# Patient Record
Sex: FEMALE | Race: BLACK OR AFRICAN AMERICAN | NOT HISPANIC OR LATINO
[De-identification: names, ages, dates, MRNs, and addresses within clinical notes are randomized per-mention and may not be internally consistent; named-entity substitution may affect disease eponyms.]

---

## 2018-09-05 ENCOUNTER — TRANSCRIPTION ENCOUNTER (OUTPATIENT)
Age: 43
End: 2018-09-05

## 2018-10-14 ENCOUNTER — EMERGENCY (EMERGENCY)
Facility: HOSPITAL | Age: 43
LOS: 1 days | Discharge: HOME | End: 2018-10-14
Attending: EMERGENCY MEDICINE
Payer: COMMERCIAL

## 2018-10-14 VITALS
HEIGHT: 64 IN | TEMPERATURE: 98 F | RESPIRATION RATE: 16 BRPM | DIASTOLIC BLOOD PRESSURE: 80 MMHG | WEIGHT: 182.1 LBS | OXYGEN SATURATION: 100 % | SYSTOLIC BLOOD PRESSURE: 150 MMHG | HEART RATE: 69 BPM

## 2018-10-14 LAB — HCG UR QL: NEGATIVE — SIGNIFICANT CHANGE UP

## 2018-10-14 PROCEDURE — 81025 URINE PREGNANCY TEST: CPT

## 2018-10-14 PROCEDURE — 99284 EMERGENCY DEPT VISIT MOD MDM: CPT | Mod: 25

## 2018-10-14 PROCEDURE — 71250 CT THORAX DX C-: CPT

## 2018-10-14 PROCEDURE — 71046 X-RAY EXAM CHEST 2 VIEWS: CPT

## 2018-10-14 PROCEDURE — 71250 CT THORAX DX C-: CPT | Mod: 26

## 2018-10-14 PROCEDURE — 71046 X-RAY EXAM CHEST 2 VIEWS: CPT | Mod: 26

## 2018-10-14 RX ORDER — AZITHROMYCIN 500 MG/1
1 TABLET, FILM COATED ORAL
Qty: 5 | Refills: 0 | OUTPATIENT
Start: 2018-10-14 | End: 2018-10-18

## 2018-10-14 NOTE — ED ADULT TRIAGE NOTE - CHIEF COMPLAINT QUOTE
States " I have this cough since over a month ,before travelling to [place], I just returned 1 week ago and I still have cough".

## 2018-10-14 NOTE — ED PROVIDER NOTE - OBJECTIVE STATEMENT
42 y/o F with no PMHx or PSHx presents to ED c/o intermittent productive cough x 6 weeks. Pt states occasional breathing difficulty but none at this time. No chest pain, fever, hemoptysis, leg pain/swelling. Pt states went to Urgent Care-1 week after symptoms onset. Chest x-ray of right lower lobe-nodular opacity and advised to have CT-scan, prescribed meds (unknown) states has not worked. After sxs onset traveled to \[facility] [place] [facility]\"" and did not have symptoms there. SHx: No ETOH, Drugs, Cigarette smoking. No hx of DVT, PE. NKDA.

## 2018-10-14 NOTE — ED PROVIDER NOTE - MEDICAL DECISION MAKING DETAILS
Pt well appearing, with severe weeks of productive cough. Will order X-ray recently showed nodular opacity will check CT pt is PERC negative.

## 2018-10-14 NOTE — ED PROVIDER NOTE - PROGRESS NOTE DETAILS
Spoke about CT findings to Dr. Augusto Patel, advised for antibiotics and prednisone. Told pt to follow up in office for pulmonology function test and further management results explained to patient. Spoke about CT findings to Dr. Augusto Patel, pulmonary, and he advised antibiotics and prednisone. Advised pt to follow up in office for pulmonology function test and further management, results explained to patient.  Return precautions given.

## 2019-02-12 PROBLEM — Z00.00 ENCOUNTER FOR PREVENTIVE HEALTH EXAMINATION: Status: ACTIVE | Noted: 2019-02-12

## 2019-02-19 ENCOUNTER — APPOINTMENT (OUTPATIENT)
Dept: THORACIC SURGERY | Facility: CLINIC | Age: 44
End: 2019-02-19
Payer: COMMERCIAL

## 2019-02-19 VITALS
TEMPERATURE: 97.9 F | HEART RATE: 73 BPM | BODY MASS INDEX: 33.66 KG/M2 | RESPIRATION RATE: 18 BRPM | HEIGHT: 63 IN | WEIGHT: 190 LBS | OXYGEN SATURATION: 100 % | SYSTOLIC BLOOD PRESSURE: 138 MMHG | DIASTOLIC BLOOD PRESSURE: 84 MMHG

## 2019-02-19 DIAGNOSIS — Z78.9 OTHER SPECIFIED HEALTH STATUS: ICD-10-CM

## 2019-02-19 PROCEDURE — 99245 OFF/OP CONSLTJ NEW/EST HI 55: CPT

## 2019-02-19 NOTE — DATA REVIEWED
[FreeTextEntry1] : CT chest on 10/14/18: indeterminant 1.6 x 1.0cm RLL density with almost tubular appearance.\par Repeated CT on 1/28/19: noncalcified masslike pulmonary lesion in the lateral aspect of the anterior mid to lower RLL. \par \par PFT on 2/11/19: FEV1 1.46, 51%.

## 2019-02-19 NOTE — HISTORY OF PRESENT ILLNESS
[FreeTextEntry1] : 42 y/o female, never smoker, without significant Pmhx, presented to local urgent care with cough x 1 month in Sep 2018, CXR revealed lung mass, completed of antibiotics course. \par \par Subsequent CT chest on 10/14/18: indeterminant 1.6 x 1.0cm RLL density with almost tubular appearance.\par Repeated CT on 1/28/19: noncalcified masslike pulmonary lesion in the lateral aspect of the anterior mid to lower RLL. \par \par PFT on 2/11/19: FEV1 1.46, 51%.\par \par She presents today for surgical consultation. The patient denies SOB, cough, chest pain, hemoptysis, palpitation, fever, recent illness, hospitalization and significant weight loss.\par \par \par

## 2019-02-19 NOTE — ASSESSMENT
[FreeTextEntry1] : 42 y/o female, never smoker, without significant Pmhx, presented to local urgent care with cough x 1 month in Sep 2018, CXR revealed lung mass, completed of antibiotics course. \par \par Subsequent CT chest on 10/14/18: indeterminant 1.6 x 1.0cm RLL density with almost tubular appearance.\par Repeated CT on 1/28/19: noncalcified masslike pulmonary lesion in the lateral aspect of the anterior mid to lower RLL. \par \par PFT on 2/11/19: FEV1 1.46, 51%.\par \par I have reviewed the patient's medical records and diagnostic images at the time of this office consultation and have made the following recommendation.\par Plan:\par 1. Right VATS, robotic assisted, Right lower lobe wedge resection, [facility] at [facility] ( date to be determinate). All risks vs. benefits and alternatives were explained to the patient, all questions were answered, patient verbalized understanding, was in agreement with the plan to proceed.\par 2. Medical clearance. \par 3. PST. \par 4. Complete PFT. \par \par \par \par Written by Shauna Montemayor NP, acting as a scribe for Dr. Luis M Hernandez.      \par “The documentation recorded by the scribe accurately reflects the service I personally performed and the decisions made by me.” Luis M Hernandez MD.\par \par

## 2019-02-19 NOTE — PHYSICAL EXAM
[General Appearance - Alert] : alert [Sclera] : the sclera and conjunctiva were normal [PERRL With Normal Accommodation] : pupils were equal in size, round, and reactive to light [Outer Ear] : the ears and nose were normal in appearance [Hearing Threshold Finger Rub Not Daniels] : hearing was normal [Neck Appearance] : the appearance of the neck was normal [Neck Cervical Mass (___cm)] : no neck mass was observed [Respiration, Rhythm And Depth] : normal respiratory rhythm and effort [Auscultation Breath Sounds / Voice Sounds] : lungs were clear to auscultation bilaterally [Heart Rate And Rhythm] : heart rate was normal and rhythm regular [Heart Sounds] : normal S1 and S2 [Examination Of The Chest] : the chest was normal in appearance [2+] : left 2+ [No Abnormalities] : the abdominal aorta was not enlarged and no bruit was heard [Bowel Sounds] : normal bowel sounds [Abdomen Soft] : soft [Abdomen Tenderness] : non-tender [Cervical Lymph Nodes Enlarged Posterior Bilaterally] : posterior cervical [Cervical Lymph Nodes Enlarged Anterior Bilaterally] : anterior cervical [No CVA Tenderness] : no ~M costovertebral angle tenderness [Abnormal Walk] : normal gait [Nail Clubbing] : no clubbing  or cyanosis of the fingernails [Musculoskeletal - Swelling] : no joint swelling seen [Skin Color & Pigmentation] : normal skin color and pigmentation [Skin Turgor] : normal skin turgor [] : no rash [No Focal Deficits] : no focal deficits [Oriented To Time, Place, And Person] : oriented to person, place, and time [Affect] : the affect was normal [Mood] : the mood was normal [FreeTextEntry1] : deferred

## 2019-02-19 NOTE — REASON FOR VISIT
[Consultation] : a consultation visit [Family Member] : family member [FreeTextEntry1] : lung nodule

## 2019-03-18 ENCOUNTER — OUTPATIENT (OUTPATIENT)
Dept: OUTPATIENT SERVICES | Facility: HOSPITAL | Age: 44
LOS: 1 days | End: 2019-03-18

## 2019-03-18 VITALS
OXYGEN SATURATION: 97 % | HEART RATE: 83 BPM | SYSTOLIC BLOOD PRESSURE: 142 MMHG | HEIGHT: 63.5 IN | WEIGHT: 188.05 LBS | TEMPERATURE: 97 F | DIASTOLIC BLOOD PRESSURE: 85 MMHG | RESPIRATION RATE: 16 BRPM

## 2019-03-18 DIAGNOSIS — R91.1 SOLITARY PULMONARY NODULE: ICD-10-CM

## 2019-03-18 DIAGNOSIS — Z98.890 OTHER SPECIFIED POSTPROCEDURAL STATES: Chronic | ICD-10-CM

## 2019-03-18 LAB
ALBUMIN SERPL ELPH-MCNC: 4.7 G/DL — SIGNIFICANT CHANGE UP (ref 3.3–5)
ALP SERPL-CCNC: 96 U/L — SIGNIFICANT CHANGE UP (ref 40–120)
ALT FLD-CCNC: 25 U/L — SIGNIFICANT CHANGE UP (ref 4–33)
ANION GAP SERPL CALC-SCNC: 11 MMO/L — SIGNIFICANT CHANGE UP (ref 7–14)
AST SERPL-CCNC: 17 U/L — SIGNIFICANT CHANGE UP (ref 4–32)
BASOPHILS # BLD AUTO: 0.02 K/UL — SIGNIFICANT CHANGE UP (ref 0–0.2)
BASOPHILS NFR BLD AUTO: 0.3 % — SIGNIFICANT CHANGE UP (ref 0–2)
BILIRUB SERPL-MCNC: 0.3 MG/DL — SIGNIFICANT CHANGE UP (ref 0.2–1.2)
BLD GP AB SCN SERPL QL: NEGATIVE — SIGNIFICANT CHANGE UP
BUN SERPL-MCNC: 10 MG/DL — SIGNIFICANT CHANGE UP (ref 7–23)
CALCIUM SERPL-MCNC: 10.2 MG/DL — SIGNIFICANT CHANGE UP (ref 8.4–10.5)
CHLORIDE SERPL-SCNC: 103 MMOL/L — SIGNIFICANT CHANGE UP (ref 98–107)
CO2 SERPL-SCNC: 28 MMOL/L — SIGNIFICANT CHANGE UP (ref 22–31)
CREAT SERPL-MCNC: 0.68 MG/DL — SIGNIFICANT CHANGE UP (ref 0.5–1.3)
EOSINOPHIL # BLD AUTO: 0.06 K/UL — SIGNIFICANT CHANGE UP (ref 0–0.5)
EOSINOPHIL NFR BLD AUTO: 0.9 % — SIGNIFICANT CHANGE UP (ref 0–6)
GLUCOSE SERPL-MCNC: 108 MG/DL — HIGH (ref 70–99)
HCG SERPL-ACNC: < 5 MIU/ML — SIGNIFICANT CHANGE UP
HCT VFR BLD CALC: 42 % — SIGNIFICANT CHANGE UP (ref 34.5–45)
HGB BLD-MCNC: 14.1 G/DL — SIGNIFICANT CHANGE UP (ref 11.5–15.5)
IMM GRANULOCYTES NFR BLD AUTO: 0.4 % — SIGNIFICANT CHANGE UP (ref 0–1.5)
LYMPHOCYTES # BLD AUTO: 2.18 K/UL — SIGNIFICANT CHANGE UP (ref 1–3.3)
LYMPHOCYTES # BLD AUTO: 32.6 % — SIGNIFICANT CHANGE UP (ref 13–44)
MCHC RBC-ENTMCNC: 30.5 PG — SIGNIFICANT CHANGE UP (ref 27–34)
MCHC RBC-ENTMCNC: 33.6 % — SIGNIFICANT CHANGE UP (ref 32–36)
MCV RBC AUTO: 90.9 FL — SIGNIFICANT CHANGE UP (ref 80–100)
MONOCYTES # BLD AUTO: 0.47 K/UL — SIGNIFICANT CHANGE UP (ref 0–0.9)
MONOCYTES NFR BLD AUTO: 7 % — SIGNIFICANT CHANGE UP (ref 2–14)
NEUTROPHILS # BLD AUTO: 3.93 K/UL — SIGNIFICANT CHANGE UP (ref 1.8–7.4)
NEUTROPHILS NFR BLD AUTO: 58.8 % — SIGNIFICANT CHANGE UP (ref 43–77)
NRBC # FLD: 0 K/UL — LOW (ref 25–125)
PLATELET # BLD AUTO: 228 K/UL — SIGNIFICANT CHANGE UP (ref 150–400)
PMV BLD: 12.2 FL — SIGNIFICANT CHANGE UP (ref 7–13)
POTASSIUM SERPL-MCNC: 3.8 MMOL/L — SIGNIFICANT CHANGE UP (ref 3.5–5.3)
POTASSIUM SERPL-SCNC: 3.8 MMOL/L — SIGNIFICANT CHANGE UP (ref 3.5–5.3)
PROT SERPL-MCNC: 7.3 G/DL — SIGNIFICANT CHANGE UP (ref 6–8.3)
RBC # BLD: 4.62 M/UL — SIGNIFICANT CHANGE UP (ref 3.8–5.2)
RBC # FLD: 11.8 % — SIGNIFICANT CHANGE UP (ref 10.3–14.5)
RH IG SCN BLD-IMP: POSITIVE — SIGNIFICANT CHANGE UP
SODIUM SERPL-SCNC: 142 MMOL/L — SIGNIFICANT CHANGE UP (ref 135–145)
WBC # BLD: 6.69 K/UL — SIGNIFICANT CHANGE UP (ref 3.8–10.5)
WBC # FLD AUTO: 6.69 K/UL — SIGNIFICANT CHANGE UP (ref 3.8–10.5)

## 2019-03-18 RX ORDER — SODIUM CHLORIDE 9 MG/ML
1000 INJECTION, SOLUTION INTRAVENOUS
Qty: 0 | Refills: 0 | Status: DISCONTINUED | OUTPATIENT
Start: 2019-04-02 | End: 2019-04-03

## 2019-03-18 NOTE — H&P PST ADULT - HISTORY OF PRESENT ILLNESS
Pt is a 44 y.o. female ; pt reports h/o cough first noted Summer 2018. Pt to PCP ; an xray was done ; " they saw something in my right lung " Pt s/p CT Scan ; pt to surgeon ; pt now presents for Flexible Bronchoscopy Right Robotic assisted Lung Resection.       pt denies fever ,denies chills, denies weight loss

## 2019-03-18 NOTE — H&P PST ADULT - NEGATIVE NEUROLOGICAL SYMPTOMS
no weakness/no generalized seizures/no transient paralysis/no focal seizures/no syncope/no paresthesias

## 2019-03-18 NOTE — H&P PST ADULT - NSANTHOSAYNRD_GEN_A_CORE
No. ITA screening performed.  STOP BANG Legend: 0-2 = LOW Risk; 3-4 = INTERMEDIATE Risk; 5-8 = HIGH Risk

## 2019-03-18 NOTE — H&P PST ADULT - NSICDXPROBLEM_GEN_ALL_CORE_FT
PROBLEM DIAGNOSES  Problem: Pulmonary nodule  Assessment and Plan: Flexible Bronchoscopy Right Robotic Assisted Lung Resection  Pre op instructions PROBLEM DIAGNOSES  Problem: Pulmonary nodule  Assessment and Plan: Flexible Bronchoscopy Right Robotic Assisted Lung Resection  Pre op instructions including Pepcid and Hibiclens given to pt ; pt appears to have a good understanding of pre op instructions  Pt to Dr Landaverde for pre op medical evaluation

## 2019-03-19 PROBLEM — D21.9 BENIGN NEOPLASM OF CONNECTIVE AND OTHER SOFT TISSUE, UNSPECIFIED: Chronic | Status: ACTIVE | Noted: 2019-03-18

## 2019-04-01 ENCOUNTER — TRANSCRIPTION ENCOUNTER (OUTPATIENT)
Age: 44
End: 2019-04-01

## 2019-04-02 ENCOUNTER — APPOINTMENT (OUTPATIENT)
Dept: THORACIC SURGERY | Facility: HOSPITAL | Age: 44
End: 2019-04-02

## 2019-04-02 ENCOUNTER — RESULT REVIEW (OUTPATIENT)
Age: 44
End: 2019-04-02

## 2019-04-02 ENCOUNTER — INPATIENT (INPATIENT)
Facility: HOSPITAL | Age: 44
LOS: 0 days | Discharge: HOME | End: 2019-04-03
Attending: THORACIC SURGERY (CARDIOTHORACIC VASCULAR SURGERY) | Admitting: THORACIC SURGERY (CARDIOTHORACIC VASCULAR SURGERY)
Payer: COMMERCIAL

## 2019-04-02 VITALS
HEART RATE: 74 BPM | RESPIRATION RATE: 16 BRPM | HEIGHT: 63.5 IN | TEMPERATURE: 98 F | WEIGHT: 188.05 LBS | SYSTOLIC BLOOD PRESSURE: 134 MMHG | DIASTOLIC BLOOD PRESSURE: 75 MMHG | OXYGEN SATURATION: 100 %

## 2019-04-02 DIAGNOSIS — R91.1 SOLITARY PULMONARY NODULE: ICD-10-CM

## 2019-04-02 DIAGNOSIS — Z98.890 OTHER SPECIFIED POSTPROCEDURAL STATES: Chronic | ICD-10-CM

## 2019-04-02 LAB
GRAM STN WND: SIGNIFICANT CHANGE UP
HCG UR QL: NEGATIVE — SIGNIFICANT CHANGE UP
RH IG SCN BLD-IMP: POSITIVE — SIGNIFICANT CHANGE UP
SPECIMEN SOURCE: SIGNIFICANT CHANGE UP

## 2019-04-02 PROCEDURE — 31622 DX BRONCHOSCOPE/WASH: CPT

## 2019-04-02 PROCEDURE — 88331 PATH CONSLTJ SURG 1 BLK 1SPC: CPT | Mod: 26

## 2019-04-02 PROCEDURE — 32666 THORACOSCOPY W/WEDGE RESECT: CPT

## 2019-04-02 PROCEDURE — 88305 TISSUE EXAM BY PATHOLOGIST: CPT | Mod: 26

## 2019-04-02 PROCEDURE — 32674 THORACOSCOPY LYMPH NODE EXC: CPT

## 2019-04-02 PROCEDURE — 99233 SBSQ HOSP IP/OBS HIGH 50: CPT

## 2019-04-02 PROCEDURE — 88307 TISSUE EXAM BY PATHOLOGIST: CPT | Mod: 26

## 2019-04-02 PROCEDURE — 71045 X-RAY EXAM CHEST 1 VIEW: CPT | Mod: 26

## 2019-04-02 RX ORDER — ASPIRIN/CALCIUM CARB/MAGNESIUM 324 MG
2 TABLET ORAL
Qty: 0 | Refills: 0 | COMMUNITY

## 2019-04-02 RX ORDER — ONDANSETRON 8 MG/1
4 TABLET, FILM COATED ORAL EVERY 6 HOURS
Qty: 0 | Refills: 0 | Status: DISCONTINUED | OUTPATIENT
Start: 2019-04-02 | End: 2019-04-03

## 2019-04-02 RX ORDER — DIPHENHYDRAMINE HCL 50 MG
25 CAPSULE ORAL EVERY 4 HOURS
Qty: 0 | Refills: 0 | Status: DISCONTINUED | OUTPATIENT
Start: 2019-04-02 | End: 2019-04-03

## 2019-04-02 RX ORDER — HEPARIN SODIUM 5000 [USP'U]/ML
5000 INJECTION INTRAVENOUS; SUBCUTANEOUS EVERY 8 HOURS
Qty: 0 | Refills: 0 | Status: DISCONTINUED | OUTPATIENT
Start: 2019-04-02 | End: 2019-04-03

## 2019-04-02 RX ORDER — SENNA PLUS 8.6 MG/1
2 TABLET ORAL AT BEDTIME
Qty: 0 | Refills: 0 | Status: DISCONTINUED | OUTPATIENT
Start: 2019-04-02 | End: 2019-04-03

## 2019-04-02 RX ORDER — HYDROMORPHONE HYDROCHLORIDE 2 MG/ML
0.5 INJECTION INTRAMUSCULAR; INTRAVENOUS; SUBCUTANEOUS
Qty: 0 | Refills: 0 | Status: DISCONTINUED | OUTPATIENT
Start: 2019-04-02 | End: 2019-04-03

## 2019-04-02 RX ORDER — HYDROMORPHONE HYDROCHLORIDE 2 MG/ML
30 INJECTION INTRAMUSCULAR; INTRAVENOUS; SUBCUTANEOUS
Qty: 0 | Refills: 0 | Status: DISCONTINUED | OUTPATIENT
Start: 2019-04-02 | End: 2019-04-03

## 2019-04-02 RX ORDER — NALOXONE HYDROCHLORIDE 4 MG/.1ML
0.1 SPRAY NASAL
Qty: 0 | Refills: 0 | Status: DISCONTINUED | OUTPATIENT
Start: 2019-04-02 | End: 2019-04-03

## 2019-04-02 RX ORDER — DOCUSATE SODIUM 100 MG
100 CAPSULE ORAL THREE TIMES A DAY
Qty: 0 | Refills: 0 | Status: DISCONTINUED | OUTPATIENT
Start: 2019-04-02 | End: 2019-04-03

## 2019-04-02 RX ADMIN — SENNA PLUS 2 TABLET(S): 8.6 TABLET ORAL at 21:44

## 2019-04-02 RX ADMIN — HEPARIN SODIUM 5000 UNIT(S): 5000 INJECTION INTRAVENOUS; SUBCUTANEOUS at 21:44

## 2019-04-02 RX ADMIN — SODIUM CHLORIDE 30 MILLILITER(S): 9 INJECTION, SOLUTION INTRAVENOUS at 19:39

## 2019-04-02 RX ADMIN — Medication 100 MILLIGRAM(S): at 21:44

## 2019-04-02 RX ADMIN — HYDROMORPHONE HYDROCHLORIDE 30 MILLILITER(S): 2 INJECTION INTRAMUSCULAR; INTRAVENOUS; SUBCUTANEOUS at 19:29

## 2019-04-02 NOTE — PROGRESS NOTE ADULT - SUBJECTIVE AND OBJECTIVE BOX
AMILCAR SINCLAIR  MRN-[id number]    Patient is a 44y old  Female who presents with a chief complaint of "I have something in my right lung " (18 Mar 2019 13:36)    HPI:  Pt is a 44 y.o. female with a history of uterine fibroids s/p myomectomy was found to have a pulmonary nodule and is s/p RLL wedge resection. Per history the patient reports h/o cough first noted in Summer 2018. She went to her primary care physician and on subsequent imaging "they saw something in my right lung." She is now s/p Flexible Bronchoscopy and right robotic lower lobe wedge resection. She has no complaints currently.     PAST MEDICAL & SURGICAL HISTORY:  Fibroids: s/p  Myomectomy 2009  S/P myomectomy: 2009    FAMILY HISTORY:  No pertinent family history in first degree relatives    Social History:  Denies smoking, illicit drug use or frequent alcohol consumption    Allergies  No Known Allergies    MEDICATIONS  (STANDING):  docusate sodium 100 milliGRAM(s) Oral three times a day  heparin  Injectable 5000 Unit(s) SubCutaneous every 8 hours  HYDROmorphone PCA (1 mG/mL) 30 milliLiter(s) PCA Continuous PCA Continuous  lactated ringers. 1000 milliLiter(s) (30 mL/Hr) IV Continuous <Continuous>  senna 2 Tablet(s) Oral at bedtime    MEDICATIONS  (PRN):  diphenhydrAMINE   Injectable 25 milliGRAM(s) IV Push every 4 hours PRN Pruritus  HYDROmorphone PCA (1 mG/mL) Rescue Clinician Bolus 0.5 milliGRAM(s) IV Push every 15 minutes PRN for Pain Scale GREATER THAN 6  naloxone Injectable 0.1 milliGRAM(s) IV Push every 3 minutes PRN For ANY of the following changes in patient status:  A. RR LESS THAN 10 breaths per minute, B. Oxygen saturation LESS THAN 90%, C. Sedation score of 6  ondansetron Injectable 4 milliGRAM(s) IV Push every 6 hours PRN Nausea    Review of Systems:  Constitutional:  Negative for weight change, fever, malaise  HEENT:  Negative for sinus pain, hoarseness, sore throat, dysphagia, vision changes  Cardiovascular:  Negative for chest pain, palpitations, dizziness  Respiratory:  Negative for cough, wheezing, dyspnea  Gastrointestinal:  Negative for nausea, vomiting, diarrhea, melena  Musculoskeletal:  Negative for pain, swelling, stiffness   Neuro:  Negative for weakness, numbness, headache  Psych:  Negative for anxiety, depression  Endocrine:  Negative for polyuria, polydipsia, temperature Intolerance    All other systems negative unless otherwise stated    ICU Vital Signs Last 24 Hrs  T(C): 36.8 (02 Apr 2019 16:30), Max: 36.8 (02 Apr 2019 16:30)  T(F): 98.3 (02 Apr 2019 16:30), Max: 98.3 (02 Apr 2019 16:30)  HR: 64 (02 Apr 2019 18:00) (59 - 74)  BP: 112/51 (02 Apr 2019 18:00) (105/58 - 134/75)  BP(mean): 64 (02 Apr 2019 18:00) (64 - 67)  ABP: --  ABP(mean): --  RR: 12 (02 Apr 2019 18:00) (10 - 19)  SpO2: 100% (02 Apr 2019 18:00) (99% - 100%)    Daily Height in cm: 161.29 (02 Apr 2019 12:00)    Daily   I&O's Summary    02 Apr 2019 07:01  -  02 Apr 2019 18:51  --------------------------------------------------------  IN: 60 mL / OUT: 10 mL / NET: 50 mL    Physical Exam:  Gen: Alert, no apparent distress  CV: Regular rate and rhythm, no murmurs, rubs or gallops  Pulm: Clear to auscultation bilaterally, no rales, rhonchi or wheezes  Chest: Chest tubes/drains in place with dressings clean, dry and intact  GI: Abd is soft, non-tender and non-distended with +BS  Ext: No clubbing, cyanosis or edema  Neuro: A+Ox3, follows commands and moves all extremities    Labs and Radiology: Pending    Assessment/Plan: Pt is a 44 y.o. female with a history of uterine fibroids s/p myomectomy was found to have a pulmonary nodule and is s/p RLL wedge resection.    Neuro:   Pain control with PCA / Tylenol IV                                           Cardiovascular:    Stable hemodynamics  Not on any pressors  Continue hemodynamic monitoring    Respiratory:  Pt is comfortable on nasal cannula  Encourage incentive spirometry  Monitor chest tube output  Chest tube to suction	  Follow up CXR    GI:  On clears diet, advance as tolerated  Continue bowel regimen  Continue Zofran for nausea - PRN	          Renal:  Continue LR 30CC/hr        Monitor I/Os and pending electrolytes    Hematologic / Oncology:  No signs of active bleeding                                         Monitor chest tube output    HSQ for DVT ppl  Follow pending CBC    Infectious disease:  All surgical incision / chest tube  sites look clean  No signs of infection   Monitor for fever / leukocytosis.    Endocrine:  Continue Accuchecks with coverage    All clinical, lab, hemodynamic and radiographic data were reviewed and the plan was discussed with CTICU team.     Cullen Ny MD

## 2019-04-02 NOTE — BRIEF OPERATIVE NOTE - NSICDXBRIEFPROCEDURE_GEN_ALL_CORE_FT
PROCEDURES:  Robot-assisted wedge resection of lung 02-Apr-2019 16:51:51 Flexible bronchoscopy, robot assisted right lower lobe wedge, mediastinal lymph node sampling Frank Campo

## 2019-04-03 ENCOUNTER — TRANSCRIPTION ENCOUNTER (OUTPATIENT)
Age: 44
End: 2019-04-03

## 2019-04-03 VITALS
SYSTOLIC BLOOD PRESSURE: 119 MMHG | HEART RATE: 64 BPM | OXYGEN SATURATION: 97 % | DIASTOLIC BLOOD PRESSURE: 64 MMHG | RESPIRATION RATE: 16 BRPM

## 2019-04-03 LAB
ANION GAP SERPL CALC-SCNC: 10 MMO/L — SIGNIFICANT CHANGE UP (ref 7–14)
BUN SERPL-MCNC: 10 MG/DL — SIGNIFICANT CHANGE UP (ref 7–23)
CALCIUM SERPL-MCNC: 9.4 MG/DL — SIGNIFICANT CHANGE UP (ref 8.4–10.5)
CHLORIDE SERPL-SCNC: 103 MMOL/L — SIGNIFICANT CHANGE UP (ref 98–107)
CO2 SERPL-SCNC: 25 MMOL/L — SIGNIFICANT CHANGE UP (ref 22–31)
CREAT SERPL-MCNC: 0.73 MG/DL — SIGNIFICANT CHANGE UP (ref 0.5–1.3)
CULTURE - ACID FAST SMEAR CONCENTRATED: SIGNIFICANT CHANGE UP
GLUCOSE SERPL-MCNC: 145 MG/DL — HIGH (ref 70–99)
HCT VFR BLD CALC: 40 % — SIGNIFICANT CHANGE UP (ref 34.5–45)
HGB BLD-MCNC: 12.8 G/DL — SIGNIFICANT CHANGE UP (ref 11.5–15.5)
MCHC RBC-ENTMCNC: 30.5 PG — SIGNIFICANT CHANGE UP (ref 27–34)
MCHC RBC-ENTMCNC: 32 % — SIGNIFICANT CHANGE UP (ref 32–36)
MCV RBC AUTO: 95.2 FL — SIGNIFICANT CHANGE UP (ref 80–100)
NRBC # FLD: 0 K/UL — SIGNIFICANT CHANGE UP (ref 0–0)
PLATELET # BLD AUTO: 191 K/UL — SIGNIFICANT CHANGE UP (ref 150–400)
PMV BLD: 12 FL — SIGNIFICANT CHANGE UP (ref 7–13)
POTASSIUM SERPL-MCNC: 4.6 MMOL/L — SIGNIFICANT CHANGE UP (ref 3.5–5.3)
POTASSIUM SERPL-SCNC: 4.6 MMOL/L — SIGNIFICANT CHANGE UP (ref 3.5–5.3)
RBC # BLD: 4.2 M/UL — SIGNIFICANT CHANGE UP (ref 3.8–5.2)
RBC # FLD: 11.9 % — SIGNIFICANT CHANGE UP (ref 10.3–14.5)
SODIUM SERPL-SCNC: 138 MMOL/L — SIGNIFICANT CHANGE UP (ref 135–145)
SPECIMEN SOURCE: SIGNIFICANT CHANGE UP
WBC # BLD: 7.99 K/UL — SIGNIFICANT CHANGE UP (ref 3.8–10.5)
WBC # FLD AUTO: 7.99 K/UL — SIGNIFICANT CHANGE UP (ref 3.8–10.5)

## 2019-04-03 PROCEDURE — 71045 X-RAY EXAM CHEST 1 VIEW: CPT | Mod: 26

## 2019-04-03 PROCEDURE — 99233 SBSQ HOSP IP/OBS HIGH 50: CPT

## 2019-04-03 PROCEDURE — 99238 HOSP IP/OBS DSCHRG MGMT 30/<: CPT

## 2019-04-03 RX ORDER — OXYCODONE HYDROCHLORIDE 5 MG/1
10 TABLET ORAL
Qty: 0 | Refills: 0 | Status: DISCONTINUED | OUTPATIENT
Start: 2019-04-03 | End: 2019-04-03

## 2019-04-03 RX ORDER — ACETAMINOPHEN 500 MG
2 TABLET ORAL
Qty: 0 | Refills: 0 | COMMUNITY
Start: 2019-04-03

## 2019-04-03 RX ORDER — ACETAMINOPHEN 500 MG
650 TABLET ORAL EVERY 6 HOURS
Qty: 0 | Refills: 0 | Status: DISCONTINUED | OUTPATIENT
Start: 2019-04-03 | End: 2019-04-03

## 2019-04-03 RX ORDER — OXYCODONE HYDROCHLORIDE 5 MG/1
1 TABLET ORAL
Qty: 12 | Refills: 0 | OUTPATIENT
Start: 2019-04-03 | End: 2019-04-05

## 2019-04-03 RX ORDER — OXYCODONE HYDROCHLORIDE 5 MG/1
5 TABLET ORAL
Qty: 0 | Refills: 0 | Status: DISCONTINUED | OUTPATIENT
Start: 2019-04-03 | End: 2019-04-03

## 2019-04-03 RX ORDER — DOCUSATE SODIUM 100 MG
1 CAPSULE ORAL
Qty: 0 | Refills: 0 | COMMUNITY
Start: 2019-04-03

## 2019-04-03 RX ADMIN — SODIUM CHLORIDE 30 MILLILITER(S): 9 INJECTION, SOLUTION INTRAVENOUS at 07:28

## 2019-04-03 RX ADMIN — Medication 100 MILLIGRAM(S): at 05:12

## 2019-04-03 RX ADMIN — Medication 650 MILLIGRAM(S): at 12:08

## 2019-04-03 RX ADMIN — ONDANSETRON 4 MILLIGRAM(S): 8 TABLET, FILM COATED ORAL at 05:49

## 2019-04-03 RX ADMIN — HYDROMORPHONE HYDROCHLORIDE 30 MILLILITER(S): 2 INJECTION INTRAMUSCULAR; INTRAVENOUS; SUBCUTANEOUS at 07:27

## 2019-04-03 RX ADMIN — HEPARIN SODIUM 5000 UNIT(S): 5000 INJECTION INTRAVENOUS; SUBCUTANEOUS at 05:12

## 2019-04-03 NOTE — DISCHARGE NOTE PROVIDER - NSDCCPGOAL_GEN_ALL_CORE_FT
To get better and follow your care plan as instructed. To get better and follow your care plan as instructed.    WOUND CARE: Keep R side wound covered x 24hrs; May remove dressing tomorrow; use mild soap, do not scrub site.  Assess for signs/symptoms of infection at site (redness, purulent [puss] drainage, swelling, pain, bleeding) and for fever/chills/temperature.  Contact thoracic office if symptoms present.  If you experience chest pain/shortness of breath, call 911.

## 2019-04-03 NOTE — DISCHARGE NOTE PROVIDER - CARE PROVIDERS DIRECT ADDRESSES
[email or web address]

## 2019-04-03 NOTE — PROGRESS NOTE ADULT - SUBJECTIVE AND OBJECTIVE BOX
ANESTHESIA POSTOP CHECK    44y Female POSTOP DAY 1    Vital Signs Last 24 Hrs  T(C): 37.1 (03 Apr 2019 08:00), Max: 37.1 (03 Apr 2019 08:00)  T(F): 98.8 (03 Apr 2019 08:00), Max: 98.8 (03 Apr 2019 08:00)  HR: 71 (03 Apr 2019 08:00) (59 - 74)  BP: 115/53 (03 Apr 2019 08:00) (95/54 - 135/51)  BP(mean): 67 (03 Apr 2019 08:00) (58 - 74)  RR: 13 (03 Apr 2019 08:00) (10 - 22)  SpO2: 96% (03 Apr 2019 08:00) (96% - 100%)  I&O's Summary    02 Apr 2019 07:01  -  03 Apr 2019 07:00  --------------------------------------------------------  IN: 450 mL / OUT: 420 mL / NET: 30 mL    03 Apr 2019 07:01  -  03 Apr 2019 10:09  --------------------------------------------------------  IN: 270 mL / OUT: 0 mL / NET: 270 mL        [X] NO APPARENT ANESTHESIA COMPLICATIONS

## 2019-04-03 NOTE — PROVIDER CONTACT NOTE (OTHER) - SITUATION
Pt assisted in getting OOB to chair. Pt sat up at side of bed; Pt complained of "dizziness". Pt suddenly unable to verbalize or hold up body weight. MD Ny called to bedside. Episode of emesis.

## 2019-04-03 NOTE — DISCHARGE NOTE PROVIDER - CARE PROVIDER_API CALL
Dominick Lambert)  Thoracic Surgery  [address] [place] [address], [place] [address]  Phone: [phone]  Fax: [phone]  Follow Up Time:     Neville Landaverde)  Internal Medicine; Pulmonary Disease  [address] [place] [address]  Phone: [phone]  Fax: [phone]  Follow Up Time:

## 2019-04-03 NOTE — DISCHARGE NOTE PROVIDER - HOSPITAL COURSE
Pt is a 44 y.o. female with a history of uterine fibroids s/p myomectomy was found to have a pulmonary nodule. Patient underwent a right lower lobe wedge resection 4/2/19. Post op course unremarkable, chest tube placed to water seal at midnight and AM CXR stable, Chest tube removed and f/u CXR without pneumothorax. pt stable for discharge 4/3/19. Plan discussed with Dr. Hernandez.

## 2019-04-03 NOTE — PROVIDER CONTACT NOTE (OTHER) - ACTION/TREATMENT ORDERED:
S/P episode of emesis, patient able to verbalize, follow commands, smile symmetrically, vital signs remained stable. No further interventions ordered. Safety maintained. S/P episode of emesis, patient able to verbalize, follow commands, smile symmetrically, equal strength in extremities, vital signs remained stable. No further interventions ordered. Safety maintained.

## 2019-04-03 NOTE — DISCHARGE NOTE NURSING/CASE MANAGEMENT/SOCIAL WORK - NSDCDPATPORTLINK_GEN_ALL_CORE
You can access the [employer] Patient Portal, offered by [facility], by registering with the following website: [email or web address]

## 2019-04-03 NOTE — DISCHARGE NOTE PROVIDER - NSDCFUADDINST_GEN_ALL_CORE_FT
- Leave dressing intact for 24 hrs by reinforcing with tape if necessary. At that time you may remove the dressing and take a shower. Place clean gauze over wound if continual drainage. Continue with daily ambulation and use of incentive spirometer. (The suture will be removed in the office).  - Call the office if you experience any fevers, shortness of breath, chest pain or excessive or purulent drainage from the incision site, leg swelling day or night. Go to the emergency room if any of these symptoms are severe.   - Take your medications as ordered and take a stool softener if needed with the narcotic medications.  - Call Dr. Lambert at [phone] tomorrow or the next business day to make a followup appointment.  - Please get an CXR the day before your appointment and bring it with you to your follow up appointment.

## 2019-04-03 NOTE — DISCHARGE NOTE PROVIDER - NSDCACTIVITY_GEN_ALL_CORE
Walking - Outdoors allowed/No heavy lifting/straining/Walking - Indoors allowed/Do not drive or operate machinery/Stairs allowed/Showering allowed/Do not make important decisions

## 2019-04-03 NOTE — PROGRESS NOTE ADULT - SUBJECTIVE AND OBJECTIVE BOX
Anesthesia Pain Management Service    SUBJECTIVE: One episode of nausea overnight with use of Hydromorphone demand dose, now resolved. Minimal use of IV PCA, pain 5-6/10. Tolerating PO.     Pain Scale Score	At rest: 5/10 	With Activity: 6/10	[X ] Refer to charted pain scores    THERAPY:    [ ] IV PCA Morphine		[ ] 5 mg/mL	[ ] 1 mg/mL  [X ] IV PCA Hydromorphone	[ ] 5 mg/mL	[X ] 1 mg/mL  [ ] IV PCA Fentanyl		[ ] 50 micrograms/mL    Demand dose __0.2_ lockout __6_ (minutes) Continuous Rate _0__ Total: 0.8   mg used (in past 24 hrs)      MEDICATIONS  (STANDING):  acetaminophen   Tablet .. 650 milliGRAM(s) Oral every 6 hours  docusate sodium 100 milliGRAM(s) Oral three times a day  heparin  Injectable 5000 Unit(s) SubCutaneous every 8 hours  lactated ringers. 1000 milliLiter(s) (30 mL/Hr) IV Continuous <Continuous>  senna 2 Tablet(s) Oral at bedtime    MEDICATIONS  (PRN):  oxyCODONE    IR 5 milliGRAM(s) Oral every 3 hours PRN Moderate Pain (4 - 6)  oxyCODONE    IR 10 milliGRAM(s) Oral every 3 hours PRN Severe Pain (7 - 10)      OBJECTIVE:    Sedation Score:	[ X] Alert	[ ] Drowsy 	[ ] Arousable	[ ] Asleep	[ ] Unresponsive    Side Effects:	[X ] None	[ ] Nausea	[ ] Vomiting	[ ] Pruritus  		[ ] Other:    Vital Signs Last 24 Hrs  T(C): 37.1 (03 Apr 2019 08:00), Max: 37.1 (03 Apr 2019 08:00)  T(F): 98.8 (03 Apr 2019 08:00), Max: 98.8 (03 Apr 2019 08:00)  HR: 71 (03 Apr 2019 08:00) (59 - 74)  BP: 115/53 (03 Apr 2019 08:00) (95/54 - 135/51)  BP(mean): 67 (03 Apr 2019 08:00) (58 - 74)  RR: 13 (03 Apr 2019 08:00) (10 - 22)  SpO2: 96% (03 Apr 2019 08:00) (96% - 100%)    ASSESSMENT/ PLAN    Therapy to  be:	[ ] Continue   [ X] Discontinued   [X ] Change to prn Analgesics    Documentation and Verification of current medications:   [X] Done	[ ] Not done, not elligible    Comments: No significant side effects, minimal use of IV Hydromorphone PCA overnight, pain is well controlled. Will switch to oral pain medications- Oxycodone 5 mg/ 10 mg PRN, standing Tylenol.

## 2019-04-03 NOTE — DISCHARGE NOTE PROVIDER - NSDCCPTREATMENT_GEN_ALL_CORE_FT
PRINCIPAL PROCEDURE  Procedure: Lung, wedge resection, right  Findings and Treatment: s/p right lower lobe wedge. Follow discharge instructions

## 2019-04-03 NOTE — PROGRESS NOTE ADULT - SUBJECTIVE AND OBJECTIVE BOX
AMILCAR SINCLAIR  [id number]    Patient is a 44y old  Female who presents with a chief complaint of RLL Wedge Resection (02 Apr 2019 18:50)    HPI:  Pt is a 44 y.o. female with a history of uterine fibroids s/p myomectomy was found to have a pulmonary nodule and is s/p RLL wedge resection. Per history the patient reports h/o cough first noted in Summer 2018. She went to her primary care physician and on subsequent imaging "they saw something in my right lung." She is now s/p Flexible Bronchoscopy and right robotic lower lobe wedge resection. She complains of nausea this morning but otherwise has no complaints.     PAST MEDICAL & SURGICAL HISTORY:  Fibroids: s/p  Myomectomy 2009  S/P myomectomy: 2009    FAMILY HISTORY:  No pertinent family history in first degree relatives    Social History:  Denies smoking, illicit drug use or frequent alcohol consumption    Allergies  No Known Allergies    MEDICATIONS  (STANDING):  docusate sodium 100 milliGRAM(s) Oral three times a day  heparin  Injectable 5000 Unit(s) SubCutaneous every 8 hours  HYDROmorphone PCA (1 mG/mL) 30 milliLiter(s) PCA Continuous PCA Continuous  lactated ringers. 1000 milliLiter(s) (30 mL/Hr) IV Continuous <Continuous>  senna 2 Tablet(s) Oral at bedtime    MEDICATIONS  (PRN):  diphenhydrAMINE   Injectable 25 milliGRAM(s) IV Push every 4 hours PRN Pruritus  HYDROmorphone PCA (1 mG/mL) Rescue Clinician Bolus 0.5 milliGRAM(s) IV Push every 15 minutes PRN for Pain Scale GREATER THAN 6  naloxone Injectable 0.1 milliGRAM(s) IV Push every 3 minutes PRN For ANY of the following changes in patient status:  A. RR LESS THAN 10 breaths per minute, B. Oxygen saturation LESS THAN 90%, C. Sedation score of 6  ondansetron Injectable 4 milliGRAM(s) IV Push every 6 hours PRN Nausea    Review of Systems:  Constitutional:  Negative for weight change, fever, malaise  HEENT:  Negative for sinus pain, hoarseness, sore throat, dysphagia, vision changes  Cardiovascular:  Negative for chest pain, palpitations, dizziness  Respiratory:  Negative for cough, wheezing, dyspnea  Gastrointestinal:  Negative for nausea, vomiting, diarrhea, melena  Musculoskeletal:  Negative for pain, swelling, stiffness   Neuro:  Negative for weakness, numbness, headache  Psych:  Negative for anxiety, depression  Endocrine:  Negative for polyuria, polydipsia, temperature Intolerance    All other systems negative unless otherwise stated    ICU Vital Signs Last 24 Hrs  T(C): 36.6 (03 Apr 2019 04:00), Max: 36.8 (02 Apr 2019 16:30)  T(F): 97.9 (03 Apr 2019 04:00), Max: 98.3 (02 Apr 2019 16:30)  HR: 65 (03 Apr 2019 06:00) (59 - 74)  BP: 105/50 (03 Apr 2019 06:00) (95/54 - 134/75)  BP(mean): 63 (03 Apr 2019 06:00) (58 - 74)  ABP: --  ABP(mean): --  RR: 17 (03 Apr 2019 05:00) (10 - 22)  SpO2: 96% (03 Apr 2019 06:00) (96% - 100%)    Daily Height in cm: 161.29 (02 Apr 2019 12:00)    Daily   I&O's Summary    02 Apr 2019 07:01  -  03 Apr 2019 06:20  --------------------------------------------------------  IN: 450 mL / OUT: 395 mL / NET: 55 mL    Physical Exam:  Gen: Alert, no apparent distress  CV: Regular rate and rhythm no murmurs, rubs or gallops  Pulm: Clear to auscultation bilaterally, no rales, rhonchi or wheezes  Chest: Chest tubes/drains in place with dressings clean, dry and intact  GI: Abd is soft, non-tender and non-distended with +BS  Ext: No clubbing, cyanosis or edema  Neuro: A+Ox3, follows commands and moves all extremities    Labs:                          12.8   7.99  )-----------( 191      ( 03 Apr 2019 03:20 )             40.0       04-03    138  |  103  |  10  ----------------------------<  145<H>  4.6   |  25  |  0.73    Ca    9.4      03 Apr 2019 03:20    Assessment/Plan: Pt is a 44 y.o. female with a history of uterine fibroids s/p myomectomy was found to have a pulmonary nodule and is s/p RLL wedge resection.    Neuro:   Pain control with PCA / Tylenol IV                                           Cardiovascular:    Stable hemodynamics  Not on any pressors  Continue hemodynamic monitoring    Respiratory:  Pt is comfortable on nasal cannula  Encourage incentive spirometry  Monitor chest tube output  Chest tube placed on water seal at midnight	  Follow up CXR    GI:  On regular diet  Continue bowel regimen  Continue Zofran for nausea - PRN	          Renal:  Continue LR 30CC/hr        Monitor I/Os and electrolytes    Hematologic / Oncology:  No signs of active bleeding                                         Monitor chest tube output    HSQ for DVT ppl    Infectious disease:  All surgical incision / chest tube  sites look clean  No signs of infection   Monitor for fever / leukocytosis.    Endocrine:  Continue Accuchecks with coverage    All clinical, lab, hemodynamic and radiographic data were reviewed and the plan was discussed with CTICU team.     Cullen Ny MD

## 2019-04-03 NOTE — DISCHARGE NOTE PROVIDER - NSDCCPCAREPLAN_GEN_ALL_CORE_FT
PRINCIPAL DISCHARGE DIAGNOSIS  Diagnosis: Lung nodule  Assessment and Plan of Treatment: s/p robotic right lower lobe wedge resection  Recover from surgery and follow up as outpatient.

## 2019-04-04 ENCOUNTER — TRANSCRIPTION ENCOUNTER (OUTPATIENT)
Age: 44
End: 2019-04-04

## 2019-04-06 LAB — SPECIMEN SOURCE: SIGNIFICANT CHANGE UP

## 2019-04-08 LAB — CULTURE - SURGICAL SITE: SIGNIFICANT CHANGE UP

## 2019-04-08 NOTE — PATIENT PROFILE ADULT - FALL HARM RISK TYPE OF ASSESSMENT
[facility]  Certificate of Child Health Examination  Student's Name:   Rebecca Adhikari Birth Date  2010  Sex  female  Race/Ethnicity   School/Grade Level/ID#     Address:   [id number] [place] [address]  Parent/Guardian             Telephone#                                            Home:                               Work:   IMMUNIZATIONS: To be completed by health care provider. The mo/da/yr for every dose administered is required. If a specific vaccine is medically contraindicated, a separate written statement must be attached by the health care responsible for completing the health examination explaining the medical reason for the contraindication.      Immunization History   Administered Date(s) Administered   • DTaP (INFANRIX)(DAPTACEL,INFANRIX) 2008, 2008, 2008, 07/15/2009, 03/22/2013   • HIB, Unspecified Formulation 2008, 2008, 2008, 07/15/2009   • Hep B, Unspecified Formulation 2008, 2008, 2008   • Hepatitis A - Adult 05/03/2011, 03/19/2012   • Influenza, Unspecified Formulation 2008, 10/11/2010, 09/19/2012, 09/22/2014, 09/21/2015, 10/25/2016, 09/26/2017, 10/01/2018   • MMR 03/16/2009   • Measles Mumps Rubella Varicella 03/22/2013   • Pneumococcal Conjugate 13 valent 03/15/2011, 05/03/2011   • Pneumococcal Conjugate 7 Valent 2008, 2008, 2008, 07/15/2009   • Polio, INACTIVATED 2008, 2008, 07/15/2009, 03/19/2012   • Rotavirus - pentavalent 2008, 2008, 2008   • Varicella 03/16/2009   Pended Date(s) Pended   • HPV 9-Valent 04/08/2019   • Meningococcal Conjugate MCV4O (Menveo) 04/08/2019   • Tdap 04/08/2019      Immunizations given 4/8/2019: TdaP, Menveo and HPV         Health care provider (MD, DO, APN, PA, school health professional, health official) verifying above immunization history must sign below. If adding dates to the above immunization history section, put your  initials by date(s) and sign here.)  Signature                                                                 Title                                                Date  _____________________________________________________________________________________  Signature                                                                 Title                                                Date  _____________________________________________________________________________________   ALTERNATIVE PROOF OF IMMUNITY   1. Clinical diagnosis (measles, mumps, hepatitis B) is allowed when verified by physician and supported with lab confirmation.  Attach copy of lab result.    * MEASLES (Rubeola)  MO DA YR          **MUMPS  MO DA YR             HEPATITIS B  MO DA YR           VARICELLA  MO DA YR      2. History of varicella (chickenpox) disease is acceptable if verified by health care provider, school health professional or health official.  Person signing below verifies that the parent/guardian’s description of varicella disease history is indicative of past infection and is accepting such history as documentation of disease.  Date of Disease                                  Signature                                                           Title   3. Laboratory Evidence of Immunity (check one) __ Measles*      __ Mumps**     __ Rubella     __Varicella Attach copy of lab result.  Lab Results                                      Date           MO DA YR                            (Attach copy of lab result)         *All measles cases diagnosed on or after July 1, 2002, must be confirmed by laboratory evidence.      **All mumps cases diagnosed on or after July 1, 2013, must be confirmed by laboratory evidence.     Completion of Alternative 1 or 3 MUST be accompanied by Labs & Physician Signature: ___________________________  Physician Statements of Immunity MUST be submitted to ID for review.      Certificates of Faith Exemption to Immunizations or Physician Medical Statements of Medical Contraindication Are Reviewed   and Maintained by the School Authority     (11/2015)                                         (COMPLETE BOTH SIDES)                                  [facility]      Student's Name:  Rebecca Adhikari Birth Date 2010 Sex female School Grade Level/ID#     Page 2 of 2   HEALTH HISTORY      TO BE COMPLETED AND SIGNED BY PARENT/GUARDIAN AND VERIFIED BY HEALTH CARE PROVIDER  ALLERGIES: (Food, drug, insect, other) No has No Known Allergies.   MEDICATION: (List all prescribed or taken on a regular basis.)   No   Diagnosis of asthma?  Child wakes during night coughing? Yes    No  Yes    No  Loss of function of one of paired  organs?(eye/ear/kidney/testicle) Yes    No    Birth defects? Yes    No  Hospitalizations? Yes    No    Developmental delay? Yes    No   When? What for? Yes    No    Blood disorders? Hemophilia,  Sickle Cell, Other? Explain. Yes    No  Surgery? (List all.)  When? What for? Yes    No    Diabetes? Yes    No  Serious injury or illness? Yes    No    Head injury/Concussion/Passed out? Yes    No  TB skin test positive (past/present)? * Yes    No *If yes, refer to local health  dept   Seizures? What are they like?  Yes    No  TB disease (past or present)? * Yes    No    Heart problem/Shortness of breath?  Yes    No  Tobacco use (type, frequency)?  Yes    No    Heart murmur/High blood pressure? Yes    No  Alcohol/Drug use?  Yes    No    Dizziness or chest pain with exercise? Yes    No  Family history of sudden death  before age 50? (Cause?) Yes    No    Eye/Vision problems? ______           __Glasses          __Contacts  Last exam by eye doctor ______  Other concerns? (crossed eye, drooping lids, squinting, difficulty reading) Dental?   __ Braces     __ Bridge     __ Plate   __Other   Ear/Hearing problems? Yes    No  Information may be shared with appropriate personnel  for health and educational purposes.   Bone/Joint problem/injury/scoliosis? Yes    No  Parent/Guardian  Signature                                               Date   PHYSICAL EXAMINATION REQUIREMENTS   Entire section below to be completed by MD//JARRED/PA Head Circumference if <2-3 years old - /62   Pulse 70   Temp 98.6 °F (37 °C) (Temporal)   Ht 5' 0.6\" (1.539 m)   Wt 33.7 kg (74 lb 6 oz)   BMI 14.24 kg/m²   BSA 1.23 m²    4 %ile (Z= -1.77) based on CDC (Girls, 2-20 Years) BMI-for-age based on BMI available as of 4/8/2019.   DIABETES SCREENING (NOT REQUIRED FOR ) BMI>85% age/sex No     And any two of the following: Family History: No  Ethnic Minority: No    Signs of Insulin Resistance (hypertension, dyslipidemia, polycystic ovarian syndrome, acanthosis nigricans): No  At Risk: No   LEAD RISK QUESTIONNAIRE Required for children age 6 months through 6 years enrolled in licensed or public school operated day care, , nursery school and/or . (Blood test required if resides in [place] or high risk zip code.)  Questionnaire Administered? No  Blood Test Indicated? No   Blood Test Date _____   Blood Test Result ______________   TB SKIN OR BLOOD TEST Recommended only for children in high-risk groups including children immunosuppressed due to HIV infection or other conditions, frequent travel to or born in high prevalence countries or those exposed to adults in high-risk categories. See CDC guidelines. http://www.cdc.gov/tb/publications/factsheets/testing/TB_testing.htm.  Test Needed: No     Test performed: No                          Skin Test:    Date Read              /      /             Result:   Positive__      Negative__        mm________                          Blood Test: Date Reported       /      /               Result:  Positive__      Negative__      Value________  LAB TESTS (recommended) Date/Result  Date/Results   Hemoglobin or Hematocrit NA Sickle Cell (when indicated)  NA   Urinalysis NA Developmental Screening Tool NA        SYSTEM REVIEW Normal  Comments/Follow-up/Needs  Normal Comments/Follow-up/Needs   Skin  Yes  Endocrine Yes    Ears Yes                  Screening Result Gastrointestinal Yes    Eyes Yes                  Screening Result: Genito-Urinary Yes                                            LMP   Nose Yes  Neurologic Yes    Throat Yes  Musculoskeletal Yes    Mouth/Dental Yes  Spinal Exam Yes    Cardiovascular/HTN Yes  Nutritional status Yes    Respiratory Yes Diagnosis of Asthma: No   Mental Health Yes    Currently Prescribed Asthma Medication:        No  Quick-relief medication (e.g. Short Acting Beta Antagonist)        No   Controller medication (e.g. inhaled corticosteroid) Other     NEEDS/MODIFICATIONS required in the school setting: No restrictions DIETARY Needs/Restrictions: No restrictions   SPECIAL INSTRUCTIONS/DEVICES e.g. safety glasses, glass eye, chest protector for arrhythmia, pacemaker, prosthetic device, dental bridge, false teeth, athletic support/cup: No restrictions   MENTAL HEALTH/OTHER Is there anything else the school should know about this student?  If you would like to discuss this student’s health with school or school health personnel:  Not needed   EMERGENCY ACTION needed while at school due to child’s health condition (e.g. ,seizures, asthma, insect sting, food, peanut allergy, bleeding problem, diabetes, heart problem)?   No   On the basis of the examination on this day, I approve this child’s participation in                                (If No or Modified please attach explanation.)  PHYSICAL EDUCATION:  Yes                               [employer]ORTS   Yes   Print Name  Magalie Bates MD         Signature                                                                       Date: 4/8/2019   Address:  [facility] [place] [address] [facility] [place] [address] [place]  [address]  [phone]         (Complete Both Sides)     Approved [facility] 3/2016   admission

## 2019-04-09 LAB — SPECIMEN SOURCE: SIGNIFICANT CHANGE UP

## 2019-04-11 PROBLEM — Z48.89 POSTOPERATIVE VISIT: Status: ACTIVE | Noted: 2019-04-11

## 2019-04-15 ENCOUNTER — APPOINTMENT (OUTPATIENT)
Dept: THORACIC SURGERY | Facility: CLINIC | Age: 44
End: 2019-04-15
Payer: COMMERCIAL

## 2019-04-15 VITALS
HEART RATE: 74 BPM | RESPIRATION RATE: 15 BRPM | DIASTOLIC BLOOD PRESSURE: 86 MMHG | SYSTOLIC BLOOD PRESSURE: 155 MMHG | TEMPERATURE: 98.3 F | OXYGEN SATURATION: 99 %

## 2019-04-15 DIAGNOSIS — Z48.89 ENCOUNTER FOR OTHER SPECIFIED SURGICAL AFTERCARE: ICD-10-CM

## 2019-04-15 PROCEDURE — 99024 POSTOP FOLLOW-UP VISIT: CPT

## 2019-04-15 RX ORDER — ACETAMINOPHEN 500 MG/1
500 TABLET, COATED ORAL
Refills: 0 | Status: ACTIVE | COMMUNITY

## 2019-04-30 LAB — FUNGUS SPEC QL CULT: SIGNIFICANT CHANGE UP

## 2019-05-14 LAB — ACID FAST STN SPEC: SIGNIFICANT CHANGE UP

## 2019-10-21 ENCOUNTER — APPOINTMENT (OUTPATIENT)
Dept: THORACIC SURGERY | Facility: CLINIC | Age: 44
End: 2019-10-21
Payer: COMMERCIAL

## 2019-10-21 DIAGNOSIS — J84.10 PULMONARY FIBROSIS, UNSPECIFIED: ICD-10-CM

## 2019-10-21 PROCEDURE — 99213 OFFICE O/P EST LOW 20 MIN: CPT

## 2019-10-21 RX ORDER — CHROMIUM 200 MCG
TABLET ORAL
Refills: 0 | Status: ACTIVE | COMMUNITY

## 2019-10-21 NOTE — CONSULT LETTER
[Dear  ___] : Dear  [unfilled], [( Thank you for referring [unfilled] for consultation for _____ )] : Thank you for referring [unfilled] for consultation for [unfilled] [Consult Letter:] : I had the pleasure of evaluating your patient, [unfilled]. [Please see my note below.] : Please see my note below. [Consult Closing:] : Thank you very much for allowing me to participate in the care of this patient.  If you have any questions, please do not hesitate to contact me. [Sincerely,] : Sincerely, [FreeTextEntry2] : Dr. Landaverde (Ref)  [FreeTextEntry3] : Luis M Hernandez MD \par Department of Cardiovascular and Thoracic Surgery \par  \par [facility] \par \par \par

## 2019-10-21 NOTE — DATA REVIEWED
[FreeTextEntry1] : CT Chest on 10/8/19:\par - stable 1.2 cm subpleural nodule in RLL\par - new 9 mm nodular opacity at Rt lung base

## 2019-10-21 NOTE — ASSESSMENT
[FreeTextEntry1] : Ms. Pettit is a 44 year old female initially to urgent care with c/o cough in September, 2018. Work-up included CXR revealing lung mass. She completed a course of ABX and then a follow up CT scan of 10/14/18 noted an indeterminant 1.6 x 1.0 cm RLL density with almost tubular appearance. Repeated CT on 1/28/19 noted a noncalcified mass-like pulmonary lesion in the lateral aspect of the anterior mid to lower RLL. \par \par She is s/p Flexible bronchoscopy, robotic-assisted RLL wedge resection with pneumolysis, and mediastinal lymph node dissection on 4/2/19. Pathology reveals scattered non-necrotizing micro granulomas. Negative level 7 and level 4 lymph nodes and negative cultures. \par \par CT Chest on 10/8/19:\par - stable 1.2 cm subpleural nodule in RLL\par - new 9 mm nodular opacity at Rt lung base which is tethered by linear bands of opacity to the adjacent posterior pleural surface.\par \par I have reviewed the patient's medical records and diagnostic images at time of this office consultation and have made the following recommendation:\par 1. I have recommended that the patient follow up in 6 months with a CT scan of the chest. \par \par Written by Sameera Alvarado NP, acting as a scribe for Luis M Hernandez MD.\par The documentation recorded by the scribe accurately reflects the service I personally performed and the decisions made by me. Luis M Hernandez MD\par  \par \par

## 2019-10-21 NOTE — HISTORY OF PRESENT ILLNESS
[FreeTextEntry1] : Ms. Pettit is a 44 year old female initially to urgent care with c/o cough in September, 2018. Work-up included CXR revealing lung mass. She completed a course of ABX and then a follow up CT scan of 10/14/18 noted an indeterminant 1.6 x 1.0 cm RLL density with almost tubular appearance. Repeated CT on 1/28/19 noted a noncalcified mass-like pulmonary lesion in the lateral aspect of the anterior mid to lower RLL. \par \par She is s/p Flexible bronchoscopy, robotic-assisted RLL wedge resection with pneumolysis, and mediastinal lymph node dissection on 4/2/19. Pathology reveals scattered non-necrotizing micro granulomas. Negative level 7 and level 4 lymph nodes and negative cultures. \par \par CT Chest on 10/8/19:\par - stable 1.2 cm subpleural nodule in RLL\par - new 9 mm nodular opacity at Rt lung base which is tethered by linear bands of opacity to the adjacent posterior pleural surface.\par \par Pt presents today for follow up. The patient denies shortness of breath, cough, fever, chills, dysphagia or hemoptysis. \par \par \par \par

## 2019-10-21 NOTE — PHYSICAL EXAM
[Sclera] : the sclera and conjunctiva were normal [Extraocular Movements] : extraocular movements were intact [Neck Appearance] : the appearance of the neck was normal [Neck Cervical Mass (___cm)] : no neck mass was observed [Jugular Venous Distention Increased] : there was no jugular-venous distention [] : no respiratory distress [Exaggerated Use Of Accessory Muscles For Inspiration] : no accessory muscle use [Respiration, Rhythm And Depth] : normal respiratory rhythm and effort [Auscultation Breath Sounds / Voice Sounds] : lungs were clear to auscultation bilaterally [Heart Rate And Rhythm] : heart rate was normal and rhythm regular [Diminished Respiratory Excursion] : normal chest expansion [Abdomen Soft] : soft [Bowel Sounds] : normal bowel sounds [Abdomen Tenderness] : non-tender [Cervical Lymph Nodes Enlarged Posterior Bilaterally] : posterior cervical [Cervical Lymph Nodes Enlarged Anterior Bilaterally] : anterior cervical [Supraclavicular Lymph Nodes Enlarged Bilaterally] : supraclavicular [Abnormal Walk] : normal gait [Skin Color & Pigmentation] : normal skin color and pigmentation [No Focal Deficits] : no focal deficits [Oriented To Time, Place, And Person] : oriented to person, place, and time [Impaired Insight] : insight and judgment were intact [Affect] : the affect was normal [Mood] : the mood was normal

## 2020-06-08 PROBLEM — R91.1 PULMONARY NODULE: Status: ACTIVE | Noted: 2019-02-19

## 2020-06-09 ENCOUNTER — APPOINTMENT (OUTPATIENT)
Dept: THORACIC SURGERY | Facility: CLINIC | Age: 45
End: 2020-06-09
Payer: COMMERCIAL

## 2020-06-09 DIAGNOSIS — R91.1 SOLITARY PULMONARY NODULE: ICD-10-CM

## 2020-06-09 PROCEDURE — 99442: CPT

## 2020-06-09 NOTE — HISTORY OF PRESENT ILLNESS
[Home] : at home, [unfilled] , at the time of the visit. [Medical Office: ([facility])___] : at the medical office located in  [Verbal consent obtained from patient] : the patient, [unfilled] [FreeTextEntry1] : Ms. Pettit is a 45 year old female initially to urgent care with c/o cough in September, 2018. Work-up included CXR revealing lung mass. She completed a course of ABX and then a follow up CT scan of 10/14/18 noted an indeterminant 1.6 x 1.0 cm RLL density with almost tubular appearance. Repeated CT on 1/28/19 noted a noncalcified mass-like pulmonary lesion in the lateral aspect of the anterior mid to lower RLL. \par \par She is s/p Flexible bronchoscopy, robotic-assisted RLL wedge resection with pneumolysis, and mediastinal lymph node dissection on 4/2/19. Pathology reveals scattered non-necrotizing micro granulomas. Negative level 7 and level 4 lymph nodes and negative cultures. \par \par CT Chest on 10/8/19:\par - stable 1.2 cm subpleural nodule in RLL\par - new 9 mm nodular opacity at Rt lung base which is tethered by linear bands of opacity to the adjacent posterior pleural surface.\par \par CT Chest on 5/30/2020:\par - a stable 1.3 x 0.8cm RLL nodule (2:60)\par - a stable 1.5cm Lt adrenal nodule c/w adenoma\par \par Patient is followed today via Telephonic visit. Denies SOB, CP, cough.

## 2020-06-09 NOTE — DATA REVIEWED
[FreeTextEntry1] : CT Chest on 5/30/2020:\par - a stable 1.3 x 0.8cm RLL nodule (2:60)\par - a stable 1.5cm Lt adrenal nodule c/w adenoma

## 2020-06-09 NOTE — ASSESSMENT
[FreeTextEntry1] : Ms. Pettit is a 45 year old female initially to urgent care with c/o cough in September, 2018. Work-up included CXR revealing lung mass. She completed a course of ABX and then a follow up CT scan of 10/14/18 noted an indeterminant 1.6 x 1.0 cm RLL density with almost tubular appearance. Repeated CT on 1/28/19 noted a noncalcified mass-like pulmonary lesion in the lateral aspect of the anterior mid to lower RLL. \par \par She is s/p Flexible bronchoscopy, robotic-assisted RLL wedge resection with pneumolysis, and mediastinal lymph node dissection on 4/2/19. Pathology reveals scattered non-necrotizing micro granulomas. Negative level 7 and level 4 lymph nodes and negative cultures. \par \par CT Chest on 5/30/2020:\par - a stable 1.3 x 0.8cm RLL nodule (2:60)\par - a stable 1.5cm Lt adrenal nodule c/w adenoma\par \par I have reviewed the patient's medical records and diagnostic images at time of this office consultation and have made the following recommendation:\par 1. CT scan reviewed and explained to patient, stable Rt lung nodule, I recommended patient to RTC in 6mo w/ CT CHest w/o contrast.\par \par \par I spent 15 minutes on the phone with patient, discussing all of above. \par \par I personally performed the services described in the documentation, reviewed the documentation recorded by the scribe in my presence and it accurately and completely records my words and actions.\par \par I, Sarabjit Negrete NP, am scribing for and the presence of SERJIO Holt, the following sections HISTORY OF PRESENT ILLNESS, PAST MEDICAL/FAMILY/SOCIAL HISTORY; REVIEW OF SYSTEMS; VITAL SIGNS; PHYSICAL EXAM; DISPOSITION.\par \par

## 2020-06-09 NOTE — CONSULT LETTER
[Dear  ___] : Dear  [unfilled], [Courtesy Letter:] : I had the pleasure of seeing your patient, [unfilled], in my office today. [( Thank you for referring [unfilled] for consultation for _____ )] : Thank you for referring [unfilled] for consultation for [unfilled] [Please see my note below.] : Please see my note below. [Consult Closing:] : Thank you very much for allowing me to participate in the care of this patient.  If you have any questions, please do not hesitate to contact me. [Sincerely,] : Sincerely, [FreeTextEntry2] : Dr. Landaverde (Pulm/Referring)  [FreeTextEntry3] : Luis M Hernandez MD\par Director of Thoracic, [facility]\par Cardiovascular & Thoracic Surgery\par \par [facility] \par [address] [place] [address]\[place] [address]\par

## 2020-08-25 ENCOUNTER — TRANSCRIPTION ENCOUNTER (OUTPATIENT)
Age: 45
End: 2020-08-25

## 2020-12-08 ENCOUNTER — APPOINTMENT (OUTPATIENT)
Dept: THORACIC SURGERY | Facility: CLINIC | Age: 45
End: 2020-12-08

## 2021-03-09 NOTE — H&P PST ADULT - MS EXT PE MLT D E PC
I reviewed the H&P, I examined the patient, and there are no changes in the patient's condition.   no pedal edema

## 2022-05-24 NOTE — ED ADULT NURSE NOTE - DISCHARGE DATE/TIME
Patient is in the supine position.   The body was positioned using the following devices: safety strap, gel pad mattress and blanket.  The head was positioned using the following devices: regular pillow.  The left arm was positioned using the following devices: arm board and self-adhering foam.  The right arm was positioned using the following devices: arm board and self-adhering foam.  The left leg was positioned using the following devices: safety strap.  The right leg was positioned using the following devices: safety strap.   14-Oct-2018 13:32

## 2022-07-12 NOTE — PATIENT PROFILE ADULT - NSPROPTRIGHTNOTIFYOBTAINDET_GEN_A_NUR
Occupational Therapy Discharge     Referred by: Hank Huerta MD; Medical Diagnosis (from order):    Diagnosis Information      Diagnosis    719.43 (ICD-9-CM) - M25.532 (ICD-10-CM) - Left wrist pain    727.05 (ICD-9-CM) - M77.8 (ICD-10-CM) - Forearm tendonitis    848.9, E927.3 (ICD-9-CM) - X50.3XXA (ICD-10-CM) - Overuse injury              Visit: 21    Visit Type: Discharge Summary -  Daily Treatment Note    SUBJECTIVE                                                                                                               \"I think I'm going to have the surgery\"    Patient saw the MD and following discussion of surgical intervention, plans to undergo surgery in the next few weeks.   Functional Change: Patient reports no functional change this date.  Current functional limitations: Patient reports she has ongoing difficulty with lifting heavier items as well as ongoing sensory symptoms limiting overall use.  Pain / Symptoms:  Pain rating (out of 10): Current: 5 (\"tingling\")     OBJECTIVE                                                                                                                     Range of Motion (ROM)   (degrees unless noted; active unless noted; norms in ( ); negative=lacking to 0, positive=beyond 0)   Wrist:    - Flexion (60-80):        • Left:  48    - Extension (60-70):        • Left: 63  Hand Functional Range of Motion:     - Thumb Tip Opposition: • Left: 0 cm    - Thumb Opposition to Base of Small Finger: • Left: 0 cm    - Index Finger Distance to Distal Palmar Crease: • Left: 1.5 cm     - Middle Finger Distance to Distal Palmar Crease: • Left: 2 cm    - Ring Finger Distance to Distal Palmar Crease: • Left: 2 cm    - Small Finger Distance to Distal Palmar Crease: • Left 1 cm    Strength  (out of 5 unless noted, standard test position unless noted, lbs tested with hand held dynamometer)   : (lbs)    - Neutral:        • Left: Trial(s): 29, 30, 32, Average: 30.33     norms: 65-69  years, male: left 57-96.6, right 70.5-111.7; female: left 32.8-49.2, right 39.9-59.3  Pinch: (lbs)    - Lateral:         • Left: Trial(s): 9, 8, 8, Average: 8.33    Lateral pinch norms: 65-69 years, male: left 18.4-25.6, right 19.5-27.3; female: left 11.5-17.1, right 12.4-17.6       Wrist/Hand Coordination:   9 Hole Peg Test (sec): Left: 26;  Results: left impaired  Norms: Age: 65-69 male left 22.9 +/-3.5, right 20.7 +/-2.9,  female left 21.4 +/-2.7, right 19.5 +/-2.3      Outcome Measures:   Quick Disabilities of the Arm, Shoulder and Hand: QuickDash Total Score (Score will not calculate if more then 2 questions are left blank): 15.91  (scored 0-100; a higher score indicates greater disability) see flowsheet for additional documentation  TREATMENT                                                                                                                  Therapeutic Exercise:  Median nerve flossing  Ulnar nerve glides     Passive wrist extensor and flexor stretch with elbow extended and arm out in front of body, 15 second holds x 5    Therapeutic Activity:  Discussed with patient plan for surgery, DC today, and ongoing recommendations for activity modification (avoid prolonged elbow flexion, ongoing gentle ROM/nerve glides, etc.)    Skilled input: verbal instruction/cues and tactile instruction/cues    Writer verbally educated and received verbal consent for hand placement, positioning of patient, and techniques to be performed today from patient for therapist position for techniques and hand placement and palpation for techniques as described above and how they are pertinent to the patient's plan of care.    Home Exercise Program/Education Materials: Desensitization (provided handout)  Ulnar nerve gliding (provided handout)  Passive wrist stretching (provided handout for lat epi)    ** provided handout 7/12 **  [employer] Access Code: [email or web address]     ASSESSMENT                                                                                                              Patient has ultimately failed conservative treatment of her painful LUE. Patient likely has numerous things going on and despite trial of various treatment options, has made little progress with therapy. 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 Patient has not met her goals of care however is functional at this time. She is agreeable to DC today.     To date the patient has made gains not as expected due to  Ongoing symptoms, need for surgery  as reported.  Patient Education:   Results of above outlined education: Verbalizes understanding      PLAN                                                                                                                           Discharge from skilled therapy with instructions/recommendations to continue home exercise program    Suggestions for next session as indicated: Discharge OT       GOALS                                                                                                                           Long Term Goals: to be met by end of plan of care  1. Patient will complete cap/lid removal without reported pain or difficulty. Status: not met  - ongoing difficulty noted  2. Patient will  and lift a light grocery bag,  steering wheel, perform light home/yard maintenance tasks without reported  pain or difficulty. Status: partially met  - able to complete light tasks however pain/difficulty persist with heavier items  3. Quick DASH: Patient will complete form to reflect an improved calculated score to less than or equal to 40 to indicate patient reported improvement in function/disability/impairment. (minimal clinically important difference = 15.91). Status: met  - for today, had been fluctuating greatly   4. Patient will be independent with progressed and modified home exercise program.   Status:  met  - for current HEP      Therapy procedure time and total treatment time can be found documented on the Time Entry flowsheet   Patient doesn't have her Dr's information with her.